# Patient Record
Sex: MALE | Race: WHITE | Employment: FULL TIME | URBAN - METROPOLITAN AREA
[De-identification: names, ages, dates, MRNs, and addresses within clinical notes are randomized per-mention and may not be internally consistent; named-entity substitution may affect disease eponyms.]

---

## 2020-11-27 ENCOUNTER — OFFICE VISIT (OUTPATIENT)
Dept: PRIMARY CARE CLINIC | Age: 30
End: 2020-11-27
Payer: COMMERCIAL

## 2020-11-27 VITALS
OXYGEN SATURATION: 97 % | WEIGHT: 214 LBS | HEIGHT: 71 IN | RESPIRATION RATE: 14 BRPM | SYSTOLIC BLOOD PRESSURE: 135 MMHG | TEMPERATURE: 98.4 F | DIASTOLIC BLOOD PRESSURE: 66 MMHG | HEART RATE: 111 BPM | BODY MASS INDEX: 29.96 KG/M2

## 2020-11-27 DIAGNOSIS — R10.9 ACUTE RIGHT FLANK PAIN: Primary | ICD-10-CM

## 2020-11-27 DIAGNOSIS — R93.2 ABNORMAL FINDINGS ON DIAGNOSTIC IMAGING OF GALL BLADDER: ICD-10-CM

## 2020-11-27 DIAGNOSIS — Z76.89 ENCOUNTER TO ESTABLISH CARE: ICD-10-CM

## 2020-11-27 DIAGNOSIS — K80.20 GALL BLADDER STONES: ICD-10-CM

## 2020-11-27 LAB
BILIRUB UR QL STRIP: NEGATIVE
GLUCOSE UR-MCNC: NEGATIVE MG/DL
KETONES P FAST UR STRIP-MCNC: NEGATIVE MG/DL
PH UR STRIP: 6.5 [PH] (ref 4.6–8)
PROT UR QL STRIP: NEGATIVE
SP GR UR STRIP: 1.02 (ref 1–1.03)
UA UROBILINOGEN AMB POC: NORMAL (ref 0.2–1)
URINALYSIS CLARITY POC: CLEAR
URINALYSIS COLOR POC: YELLOW
URINE BLOOD POC: NEGATIVE
URINE LEUKOCYTES POC: NEGATIVE
URINE NITRITES POC: NEGATIVE

## 2020-11-27 PROCEDURE — 81003 URINALYSIS AUTO W/O SCOPE: CPT | Performed by: NURSE PRACTITIONER

## 2020-11-27 PROCEDURE — 99203 OFFICE O/P NEW LOW 30 MIN: CPT | Performed by: NURSE PRACTITIONER

## 2020-11-27 RX ORDER — CETIRIZINE HYDROCHLORIDE 10 MG/1
CAPSULE, LIQUID FILLED ORAL
COMMUNITY

## 2020-11-27 NOTE — PROGRESS NOTES
Chief Complaint   Patient presents with    Flank Pain     10 days pt denies nausea    [place] Other     \"Shin splints\" bilateral     1. Have you been to the ER, urgent care clinic since your last visit? Hospitalized since your last visit? no    2. Have you seen or consulted any other health care providers outside of the [address] [place] [address] since your last visit? Include any pap smears or colon screening.   No    Health Maintenance Due   Topic Date Due    DTaP/Tdap/Td series (1 - Tdap) 08/14/2010    Flu Vaccine (1) 09/01/2020

## 2020-11-27 NOTE — PROGRESS NOTES
This note will not be viewable in [address] Chitra Womack is a 31y.o. year old male who is a new patient to me today. He was not previously followed by primary care. Chitra Womack is a  31 y.o. male presents for visit. Chief Complaint   Patient presents with    Flank Pain     10 days pt denies nausea    [place] Other     \"Shin splints\" bilateral       HPI    Presents to establish care. He is generally healthy. Endorses right flank pain for the past 10 days-he suspects gallstones. Pain level is mild -moderate level. Past medical history of gallstones on CT 4 or 5 years ago. Reports pain is similar and he is interested in imaging. Recently started working out and is experiencing shinsplints with running. No additional concerns. .       Review of Systems   Constitutional: Negative for chills and fever. HENT: Negative. Negative for congestion. Respiratory: Negative for cough and shortness of breath. Cardiovascular: Negative. Gastrointestinal: Negative. Genitourinary: Negative. Musculoskeletal: Positive for back pain. Endo/Heme/Allergies: Positive for environmental allergies. All other systems reviewed and are negative. Past Medical History:   Diagnosis Date    Allergies       History reviewed. No pertinent surgical history. Social History     Tobacco Use    Smoking status: Never Smoker    Smokeless tobacco: Never Used   Substance Use Topics    Alcohol use: Yes     Comment: occ      Social History     Social History Narrative    Not on file     Family History   Problem Relation Age of Onset    Stroke Mother     Diabetes Father     Heart Disease Father       Prior to Admission medications    Medication Sig Start Date End Date Taking? Authorizing Provider   Cetirizine (ZyrTEC) 10 mg cap Take  by mouth.    Yes Provider, Historical      No Known Allergies       Visit Vitals  /66 (BP 1 Location: Left arm, BP Patient Position: Sitting) Pulse (!) 111   Temp 98.4 °F (36.9 °C) (Oral)   Resp 14   Ht 5' 11\" (1.803 m)   Wt 214 lb (97.1 kg)   SpO2 97%   BMI 29.85 kg/m²     Physical Exam  Vitals signs and nursing note reviewed. Constitutional:       General: He is not in acute distress. Appearance: Normal appearance. He is well-developed. HENT:      Head: Normocephalic and atraumatic. Right Ear: External ear normal.      Left Ear: External ear normal.      Nose: Nose normal.   Eyes:      Conjunctiva/sclera: Conjunctivae normal.   Cardiovascular:      Rate and Rhythm: Normal rate and regular rhythm. Heart sounds: Normal heart sounds. Pulmonary:      Effort: Pulmonary effort is normal.      Breath sounds: Normal breath sounds. No wheezing. Musculoskeletal:      Lumbar back: He exhibits normal range of motion and no tenderness. Arms:    Skin:     General: Skin is warm and dry. Neurological:      Mental Status: He is alert and oriented to person, place, and time. Psychiatric:         Speech: Speech normal.         Behavior: Behavior normal.               No results found for this or any previous visit (from the past 24 hour(s)). ASSESSMENT AND PLAN:  There are no active problems to display for this patient. ICD-10-CM ICD-9-CM   1. Acute right flank pain  R10.9 789.09     338.19   2. Gall bladder stones  K80.20 574.20   3. Abnormal findings on diagnostic imaging of gall bladder  R93.2 793.3   4. Encounter to establish care  Z76.89 V65.8     Orders Placed This Encounter    US ABD LTD     History of gall bladder wall thickening and gall stones     Standing Status:   Future     Number of Occurrences:   1     Standing Expiration Date:   12/27/2021     Order Specific Question:   Specific Body Part     Answer:   gall bladder     Order Specific Question:   Reason for Exam     Answer:   dull ache    AMB POC URINALYSIS DIP STICK AUTO W/O MICRO    Cetirizine (ZyrTEC) 10 mg cap     Sig: Take  by mouth.        Diagnoses and all orders for this visit:    1. Acute right flank pain  -     AMB POC URINALYSIS DIP STICK AUTO W/O MICRO    2. Gall bladder stones  -     US ABD LTD; Future    3. Abnormal findings on diagnostic imaging of gall bladder  -     US ABD LTD; Future    4. Encounter to establish care        lab results and schedule of future lab studies reviewed with patient  reviewed diet, exercise and weight control  radiology results and schedule of future radiology studies reviewed with patient  Continue current treatment pending test results. Continue to stay active- 30 minutes 5 days per week. Follow-up and Dispositions    · Return if symptoms worsen or fail to improve. Disclaimer:  Advised him to call back or return to office if symptoms worsen/change/persist.  Discussed expected course/resolution/complications of diagnosis in detail with patient. Medication risks/benefits/costs/interactions/alternatives discussed with patient. He was given an after visit summary which includes diagnoses, current medications, & vitals. He expressed understanding with the diagnosis and plan.

## 2020-12-02 ENCOUNTER — HOSPITAL ENCOUNTER (OUTPATIENT)
Dept: ULTRASOUND IMAGING | Age: 30
Discharge: HOME | End: 2020-12-02
Attending: NURSE PRACTITIONER
Payer: COMMERCIAL

## 2020-12-02 DIAGNOSIS — K80.20 GALL BLADDER STONES: ICD-10-CM

## 2020-12-02 DIAGNOSIS — R93.2 ABNORMAL FINDINGS ON DIAGNOSTIC IMAGING OF GALL BLADDER: ICD-10-CM

## 2020-12-02 PROCEDURE — 76705 ECHO EXAM OF ABDOMEN: CPT

## 2020-12-14 NOTE — PROGRESS NOTES
Report reviewed. Ultrasound confirms the presence of gallstones. His pain was mild to moderate at his visit. Please ask if symptoms are improved or getting worse. If worse I will refer him to gastroenterology for further eval and treatment. Thanks notified by my chart as well.

## 2020-12-14 NOTE — PATIENT INSTRUCTIONS
Learning About Gallstones What are gallstones? Gallstones are stones that form in the gallbladder. The gallbladder is a small sac located just under the liver. It stores bile released by the liver. Bile helps you digest fats. Gallstones can be smaller than a grain of sand or as large as a golf ball. Gallstones form when cholesterol and other things found in bile make stones. They can also form if the gallbladder doesn't empty as it should. Gallstones can also form in the common bile duct or cystic duct. These tubes carry bile from the gallbladder and the liver to the small intestine. What happens when you have gallstones? The progression of gallstones depends on whether you have symptoms. Most people with gallstones have no symptoms and do not need treatment. The most common problem caused by gallstones occurs when a gallstone blocks the cystic duct that drains the gallbladder. It often causes bouts of pain that come and go as the gallbladder contracts and expands. The bouts of pain are often severe and steady. The pain can last from 15 minutes to up to 6 hours. And the pain may get worse after a meal. Symptoms usually improve within a few days. If the pain is severe or if you have had gallbladder pain before, you may need to have your gallbladder removed. In rare cases, gallstones can cause pancreatitis, an inflammation of the pancreas. Gallstones back up the flow of digestive enzymes made by the pancreas. Pancreatitis may cause sudden, severe belly pain, loss of appetite, nausea and vomiting, and fever. What are the symptoms? Most people who have gallstones don't have symptoms. When symptoms occur, they can include: 
· Pain in the pit of your stomach or in the upper right part of your belly. It may spread to your right upper back or shoulder blade area. · Pain that may come and go or be steady. It may get worse when you eat. · Fever and chills, if a gallstone is blocking a bile duct and causing an infection. · Yellowing of your skin and the whites of your eyes. Pain can last 15 minutes to 24 hours. Continuous pain for 1 to 5 hours is common. The pain may begin at night and be severe enough to wake you. Pain often starts after eating food that is high in fat. The pain usually makes it hard to get comfortable. Moving around doesn't make the pain go away. How can you prevent gallstones? There is no sure way to prevent gallstones. But you can reduce your risk of forming gallstones that can cause symptoms. · Stay at a healthy weight. If you need to lose weight, do so slowly and sensibly. · Eat regular, balanced meals. · Be active, and exercise regularly. How are gallstones treated? · If you don't have symptoms, you probably don't need treatment. · For mild symptoms, your doctor may have you take pain medicine and wait to see if the pain goes away. · For severe pain or infection, or if you have more than one gallstone attack, your doctor may suggest surgery to have your gallbladder removed. The body works fine without a gallbladder. Follow-up care is a key part of your treatment and safety. Be sure to make and go to all appointments, and call your doctor if you are having problems. It's also a good idea to know your test results and keep a list of the medicines you take. Where can you learn more? Go to http://www.gray.com/ Enter  in the search box to learn more about \"Learning About Gallstones. \" Current as of: April 15, 2020               Content Version: 12.6 © 0522-3715 [employer]. Care instructions adapted under license by [employer] (which disclaims liability or warranty for this information).  If you have questions about a medical condition or this instruction, always ask your healthcare professional. Mariella Pina disclaims any warranty or liability for your use of this information.

## 2023-05-14 RX ORDER — CETIRIZINE HYDROCHLORIDE 10 MG/1
CAPSULE, LIQUID FILLED ORAL
COMMUNITY